# Patient Record
Sex: FEMALE | Race: WHITE | NOT HISPANIC OR LATINO
[De-identification: names, ages, dates, MRNs, and addresses within clinical notes are randomized per-mention and may not be internally consistent; named-entity substitution may affect disease eponyms.]

---

## 2017-12-22 ENCOUNTER — TRANSCRIPTION ENCOUNTER (OUTPATIENT)
Age: 42
End: 2017-12-22

## 2017-12-24 ENCOUNTER — TRANSCRIPTION ENCOUNTER (OUTPATIENT)
Age: 42
End: 2017-12-24

## 2019-07-24 ENCOUNTER — TRANSCRIPTION ENCOUNTER (OUTPATIENT)
Age: 44
End: 2019-07-24

## 2022-10-30 ENCOUNTER — NON-APPOINTMENT (OUTPATIENT)
Age: 47
End: 2022-10-30

## 2023-10-05 ENCOUNTER — NON-APPOINTMENT (OUTPATIENT)
Age: 48
End: 2023-10-05

## 2024-03-07 PROBLEM — Z00.00 ENCOUNTER FOR PREVENTIVE HEALTH EXAMINATION: Status: ACTIVE | Noted: 2024-03-07

## 2024-03-19 ENCOUNTER — APPOINTMENT (OUTPATIENT)
Dept: GYNECOLOGIC ONCOLOGY | Facility: CLINIC | Age: 49
End: 2024-03-19
Payer: COMMERCIAL

## 2024-03-19 VITALS
RESPIRATION RATE: 18 BRPM | WEIGHT: 162 LBS | BODY MASS INDEX: 27.66 KG/M2 | TEMPERATURE: 98 F | OXYGEN SATURATION: 100 % | DIASTOLIC BLOOD PRESSURE: 89 MMHG | HEIGHT: 64 IN | HEART RATE: 76 BPM | SYSTOLIC BLOOD PRESSURE: 160 MMHG

## 2024-03-19 DIAGNOSIS — Z80.9 FAMILY HISTORY OF MALIGNANT NEOPLASM, UNSPECIFIED: ICD-10-CM

## 2024-03-19 DIAGNOSIS — Z78.9 OTHER SPECIFIED HEALTH STATUS: ICD-10-CM

## 2024-03-19 DIAGNOSIS — Z80.1 FAMILY HISTORY OF MALIGNANT NEOPLASM OF TRACHEA, BRONCHUS AND LUNG: ICD-10-CM

## 2024-03-19 DIAGNOSIS — Z82.49 FAMILY HISTORY OF ISCHEMIC HEART DISEASE AND OTHER DISEASES OF THE CIRCULATORY SYSTEM: ICD-10-CM

## 2024-03-19 DIAGNOSIS — G35 MULTIPLE SCLEROSIS: ICD-10-CM

## 2024-03-19 DIAGNOSIS — Z86.39 PERSONAL HISTORY OF OTHER ENDOCRINE, NUTRITIONAL AND METABOLIC DISEASE: ICD-10-CM

## 2024-03-19 DIAGNOSIS — Z80.7 FAMILY HISTORY OF OTHER MALIGNANT NEOPLASMS OF LYMPHOID, HEMATOPOIETIC AND RELATED TISSUES: ICD-10-CM

## 2024-03-19 DIAGNOSIS — Z84.89 FAMILY HISTORY OF OTHER SPECIFIED CONDITIONS: ICD-10-CM

## 2024-03-19 DIAGNOSIS — Z80.6 FAMILY HISTORY OF LEUKEMIA: ICD-10-CM

## 2024-03-19 DIAGNOSIS — Z86.79 PERSONAL HISTORY OF OTHER DISEASES OF THE CIRCULATORY SYSTEM: ICD-10-CM

## 2024-03-19 DIAGNOSIS — Z86.59 PERSONAL HISTORY OF OTHER MENTAL AND BEHAVIORAL DISORDERS: ICD-10-CM

## 2024-03-19 DIAGNOSIS — Z83.438 FAMILY HISTORY OF OTHER DISORDER OF LIPOPROTEIN METABOLISM AND OTHER LIPIDEMIA: ICD-10-CM

## 2024-03-19 DIAGNOSIS — Z80.3 FAMILY HISTORY OF MALIGNANT NEOPLASM OF BREAST: ICD-10-CM

## 2024-03-19 PROCEDURE — G2211 COMPLEX E/M VISIT ADD ON: CPT | Mod: NC,1L

## 2024-03-19 PROCEDURE — 99204 OFFICE O/P NEW MOD 45 MIN: CPT

## 2024-03-19 PROCEDURE — 99459 PELVIC EXAMINATION: CPT

## 2024-03-19 RX ORDER — LOSARTAN POTASSIUM 50 MG/1
50 TABLET, FILM COATED ORAL
Refills: 0 | Status: ACTIVE | COMMUNITY

## 2024-03-19 RX ORDER — LEVOTHYROXINE SODIUM 25 UG/1
25 TABLET ORAL
Refills: 0 | Status: ACTIVE | COMMUNITY

## 2024-03-19 RX ORDER — BUPROPION HYDROCHLORIDE 300 MG/1
300 TABLET, EXTENDED RELEASE ORAL
Refills: 0 | Status: ACTIVE | COMMUNITY

## 2024-03-19 NOTE — CHIEF COMPLAINT
There are no Wet Read(s) to document.
[FreeTextEntry1] : here for evaluation of her thicknened endometrium and ovarian mass

## 2024-03-19 NOTE — PHYSICAL EXAM
[Chaperone Present] : A chaperone was present in the examining room during all aspects of the physical examination [Absent] : CVAT: absent [Abnormal] : Adnexa(ae): Abnormal [Normal] : Recto-Vaginal Exam: Normal [Fully active, able to carry on all pre-disease performance without restriction] : Status 0 - Fully active, able to carry on all pre-disease performance without restriction [de-identified] : AV not tender mobile

## 2024-03-19 NOTE — ASSESSMENT
[FreeTextEntry1] : Patient at age 48 has an enlarged ovarian cyst most likely from the left side.  The cyst also appears to have thickened wall. Furthermore the patient complaining of significant vaginal discharge in the last month or 2 which has gotten worse.  She has had several evaluation including multiple test for infection including GC chlamydia bacterial vaginosis and general vaginal culture.  Of them have turned out to be negative so far.  Assessment of the cervix revealed a discharge coming out of the level of the cervical os which is consistent with a finding of a large cyst within the endocervical canal.  Clinically I believe that the cyst in the ovary is benign.  However given this thickened wall I would still recommend that the patient consider removal of the ovary and tube with a frozen section analysis. Given patient's age I would recommend her to consider removal of both ovaries and tubes in view of her extensive family history of cancer and also her nulliparous status which makes her at a slight increased risk for ovarian cancer compared to the general population. Finally I would definitely dilate the cervical canal and drain the fluid from the cyst fluid.  In addition I would also perform a D&C hysteroscopy realizing that the lining is at 6 mm in thickness.  Discussed minimal invasive approach with robotic assistance. Discussed usage of antibiotics DVT prophylaxis and possible transfusion. Discussed length of the procedure. Discussed the possibility of a staging surgery including hysterectomy BSO nodes and omentum in the case of a malignancy on frozen section. Discussed risk of laparotomy which is small. This will most likely be a same-day discharge as well as patient meets the milestones. Discussed possible complication and repair. Discussed postoperative care and instruction. Discussed the possibility of a staging surgery including hysterectomy in the case that the frozen section report came back positive for malignancy Patient has no further question. While I recommend that she should consider removal of both ovaries and tubes at the minimum along with the hysteroscopic evaluation and evacuation the patient will let me know in the morning of the procedure whether she prefer 1 ovary and tube removal or both ovaries and tubes removal in case that this is a benign finding. I answered all their question. Patient's father is undergoing his procedure tomorrow.  She would like to make sure that Stefani is okay with him before proceeding Send today for surgery on April 29 2024 She will need a medical clearance along with a presurgical testing date I have ordered a  also for her  Procedure  RA laparoscopic USO versus BSO and staging, D&C hysteroscopy   Indication Large ovarian mass and vaginal discharge from the cervical canal   Benefits  Removal of diseased tissue and removal of her discharge   Risks  Discussed risks of blood loss, infection, injury to adjacent organs, potential need for transfusion and other events.   Discussed alternatives  Observation

## 2024-03-19 NOTE — HISTORY OF PRESENT ILLNESS
[FreeTextEntry1] : 47y/o P 0000 LMP 2/2023 who last saw Dr Berkowitz in 6/23  Prior to that it was about 3 years ago.  An ovarian cyst was noted at the time.  Patient did not have any symptoms of pain, abnormal bleeding.   Patient had a sonogram that showed a left ovarian cyst that has a mildly thickened lining. Again she had no pain no bleeding.   Symptom of hot flashes per patient. Recently she c/o significant vaginal discharge that is yellowish and thick.  There was no odor.  There was no bleeding  she had an MRI after that showing similar finding of a large ovary on the left side [id number] with an ovarian cyst that measured 6.1x4.3x4.7cm.   In addition there is a cyst in the endocervical canal at 3.3x3.1x3cm. It was noted in the previous sonogram but much smaller at 1.7x0.9x2cm.  The uterus has a 2.2x2x2.6cm intramural fibroid and a lining of 0.6cm.  she came here for further evaluation.  Her major concern is her continuous discharge  H/O of abnormal Pap.  Colposcopy was negative for any evidence of abnormality  Mammogram 6/2023 colonoscopy has not had one done yet no family history of colon cancer Pap done 6/2023

## 2024-03-19 NOTE — LETTER BODY
[Attached please find my note.] : Attached please find my note. [FreeTextEntry2] : Gibran Berkowitz MD [address] [facility] [address] [place] [address] Tel [phone] [phone]  Dear Dr Berkowitz   Ms Nidhi Russell was seen in my office for a consultation regarding her vaginal discharge and ovarian cyst.  Please see my consult note for her plan of care.  Thank you for allowing me to participate in the care of this patient.    Please do not hesitate to call if you have any questions.    Most Sincerely,      Josiah Moss MD [facility] Director, [facility] Division of Gynecologic Oncology Department Obstetrics and Gynecology Tel [phone] [email or web address]

## 2024-03-20 LAB — CANCER AG125 SERPL-ACNC: 34 U/ML

## 2024-04-18 ENCOUNTER — OUTPATIENT (OUTPATIENT)
Dept: OUTPATIENT SERVICES | Facility: HOSPITAL | Age: 49
LOS: 1 days | End: 2024-04-18
Payer: COMMERCIAL

## 2024-04-18 VITALS
WEIGHT: 162.92 LBS | TEMPERATURE: 98 F | OXYGEN SATURATION: 98 % | DIASTOLIC BLOOD PRESSURE: 80 MMHG | HEIGHT: 64 IN | HEART RATE: 71 BPM | RESPIRATION RATE: 18 BRPM | SYSTOLIC BLOOD PRESSURE: 151 MMHG

## 2024-04-18 DIAGNOSIS — E03.9 HYPOTHYROIDISM, UNSPECIFIED: ICD-10-CM

## 2024-04-18 DIAGNOSIS — Z98.890 OTHER SPECIFIED POSTPROCEDURAL STATES: Chronic | ICD-10-CM

## 2024-04-18 DIAGNOSIS — Z90.89 ACQUIRED ABSENCE OF OTHER ORGANS: Chronic | ICD-10-CM

## 2024-04-18 DIAGNOSIS — G47.00 INSOMNIA, UNSPECIFIED: ICD-10-CM

## 2024-04-18 DIAGNOSIS — F32.89 OTHER SPECIFIED DEPRESSIVE EPISODES: ICD-10-CM

## 2024-04-18 DIAGNOSIS — E78.5 HYPERLIPIDEMIA, UNSPECIFIED: ICD-10-CM

## 2024-04-18 DIAGNOSIS — Z01.818 ENCOUNTER FOR OTHER PREPROCEDURAL EXAMINATION: ICD-10-CM

## 2024-04-18 DIAGNOSIS — N83.209 UNSPECIFIED OVARIAN CYST, UNSPECIFIED SIDE: ICD-10-CM

## 2024-04-18 DIAGNOSIS — M19.90 UNSPECIFIED OSTEOARTHRITIS, UNSPECIFIED SITE: ICD-10-CM

## 2024-04-18 DIAGNOSIS — N89.8 OTHER SPECIFIED NONINFLAMMATORY DISORDERS OF VAGINA: ICD-10-CM

## 2024-04-18 DIAGNOSIS — I10 ESSENTIAL (PRIMARY) HYPERTENSION: ICD-10-CM

## 2024-04-18 DIAGNOSIS — Z90.49 ACQUIRED ABSENCE OF OTHER SPECIFIED PARTS OF DIGESTIVE TRACT: Chronic | ICD-10-CM

## 2024-04-18 DIAGNOSIS — G35 MULTIPLE SCLEROSIS: ICD-10-CM

## 2024-04-18 LAB — BLD GP AB SCN SERPL QL: SIGNIFICANT CHANGE UP

## 2024-04-18 NOTE — H&P PST ADULT - PROBLEM SELECTOR PLAN 3
Instructed to continue antihypertensive medication-Losartan and take it with sips of water on day of surgery. Follow-up with PCP for management.

## 2024-04-18 NOTE — H&P PST ADULT - HISTORY OF PRESENT ILLNESS
This is a 48 yr old female with PMH of multiple sclerosis-asymptomatic-not in any meds-diagnosed in 2001-last seen by neurology in 2020, HTN on Losartan, Hypothyroidism on levothyroxine, depression on Bupropion, OA of knees and low back pain, insomnia on Melatonin, COVID-19 virus infection in 2022-fully recovered,  who presents with c/o left ovarian cyst and vaginal discharge yellowish in color without any odor. Pt is scheduled for robotic assisted laparoscopic salpingo-oophorectomy (side to be determined in the OR) with dilation and curettage, hysteroscopy on 4/29/2024.

## 2024-04-18 NOTE — H&P PST ADULT - ATTENDING COMMENTS
Patient seen at the surgical suite  We reiterated the planned procedure  she opts to have one ovary and tube removed only assuming that this is not cancerous.  will also perform D & C Hysteroscopy    discussed possible staging procedure  discussed possible complications  discussed post operative care  answered all questions  consent form signed  will proceed as planned

## 2024-04-18 NOTE — H&P PST ADULT - NSICDXFAMILYHX_GEN_ALL_CORE_FT
FAMILY HISTORY:  Father  Still living? Yes, Estimated age: Age Unknown  Family history of stomach cancer, Age at diagnosis: Age Unknown     FAMILY HISTORY:  Father  Still living? Yes, Estimated age: Age Unknown  Family history of stomach cancer, Age at diagnosis: Age Unknown    Mother  Still living? Yes, Estimated age: Age Unknown  Family history of hyperlipidemia, Age at diagnosis: Age Unknown  Family history of hypertension, Age at diagnosis: Age Unknown    Sibling  Still living? Yes, Estimated age: Age Unknown  Family history of hypertension, Age at diagnosis: Age Unknown

## 2024-04-18 NOTE — H&P PST ADULT - PROBLEM SELECTOR PLAN 4
pt asymptomatic-not in any meds-diagnosed in 2001-last seen by neurology in 2020.  Last visit note with neurology requested.  Encouraged pt to make appt with neurology for [id number] will try to make appt before surgery.

## 2024-04-18 NOTE — H&P PST ADULT - ASSESSMENT
From: Narvis Filter  To: Chasidy Hoff  Sent: 12/2/2023 8:01 AM CST  Subject: Group B strep test    Hello, I saw my gyne for some issues I was having and she did some cultures and she said I tested positive for group b strep in my vaginal culture. My question is that I read that joint pain could be a symptom of this infection. I have been having joint pain in my elbow for a few months now and was wondering if this could be the cause and if I should be following up with you about that or are the 2 unrelated? Thank you for the help! This is a 48 yr old female with PMH of multiple sclerosis-asymptomatic-not in any meds-diagnosed in 2001-last seen by neurology in 2020, HTN on Losartan, Hypothyroidism on levothyroxine, depression on Bupropion, OA of knees and low back pain, insomnia on Melatonin, COVID-19 virus infection in 2022-fully recovered,  who presents with left ovarian cyst and vaginal discharge. Pt is scheduled for robotic assisted laparoscopic salpingo-oophorectomy (side to be determined in the OR) with dilation and curettage, hysteroscopy on 4/29/2024.  AMANDA stop bang score 4. Pt denies history of AMANDA, never did sleep study.

## 2024-04-18 NOTE — H&P PST ADULT - NSICDXPASTMEDICALHX_GEN_ALL_CORE_FT
PAST MEDICAL HISTORY:  Hypertension     Hypothyroidism     MS (multiple sclerosis)      PAST MEDICAL HISTORY:  Allergic rhinitis     Discharge from the vagina     Hyperlipidemia     Hypertension     Hypothyroidism     MS (multiple sclerosis)     Other depression     Ovarian cyst

## 2024-04-18 NOTE — H&P PST ADULT - PROBLEM SELECTOR PLAN 9
Pt instructed to avoid NSAIDs-meloxicam 1 week before surgery,  to take Tylenol as needed for pain.   Follow-up with PCP for management.   Stated understanding of instructions given.

## 2024-04-18 NOTE — H&P PST ADULT - NSICDXPROCEDURE_GEN_ALL_CORE_FT
PROCEDURES:  Hysteroscopy, with dilation and curettage 18-Apr-2024 11:37:17  Hilda Pham  Robot-assisted salpingo-oophorectomy 18-Apr-2024 11:38:11  Hilda Pham

## 2024-04-18 NOTE — H&P PST ADULT - PROBLEM SELECTOR PLAN 1
Pt is scheduled for robotic assisted laparoscopic salpingo-oophorectomy (side to be determined in the OR)  on 4/29/2024.  AMANDA stop bang score 4. Pt denies history of AMANDA, never did sleep study.  Preoperative instructions discussed with pt and given to pt. Instructed pt that she will need someone to escort her home after surgery, to notify security when she arrives in the lobby of the hospital that she is here for surgery, not to eat or drink anything after midnight the night before the surgery, to avoid NSAIDs such as Ibuprofen, Motrin, Aleve, Advil, Naproxen before surgery, to take Tylenol if needed for pain, to report if she has been exposed to any one with any contagious diseases including Covid-19 or if she is exhibiting any symptoms of COVID-19. Instructed about use of Chlorhexidine 4% soap for 3 days before surgery including the morning of the surgery to prevent infection. Verbalized understanding of instructions given.

## 2024-04-18 NOTE — H&P PST ADULT - NSICDXPASTSURGICALHX_GEN_ALL_CORE_FT
PAST SURGICAL HISTORY:  History of appendectomy     History of bunionectomy of both great toes     History of open reduction and internal fixation (ORIF) procedure     History of tonsillectomy

## 2024-04-18 NOTE — H&P PST ADULT - PROBLEM SELECTOR PLAN 5
Instructed to continue Levothyroxine and take it with sips of water on day of surgery.   Will obtain last TSH from PCP.  Follow-up with provider for management.

## 2024-04-19 PROCEDURE — G0463: CPT

## 2024-04-28 ENCOUNTER — TRANSCRIPTION ENCOUNTER (OUTPATIENT)
Age: 49
End: 2024-04-28

## 2024-04-29 ENCOUNTER — TRANSCRIPTION ENCOUNTER (OUTPATIENT)
Age: 49
End: 2024-04-29

## 2024-04-29 ENCOUNTER — APPOINTMENT (OUTPATIENT)
Dept: GYNECOLOGIC ONCOLOGY | Facility: HOSPITAL | Age: 49
End: 2024-04-29
Payer: COMMERCIAL

## 2024-04-29 ENCOUNTER — OUTPATIENT (OUTPATIENT)
Dept: OUTPATIENT SERVICES | Facility: HOSPITAL | Age: 49
LOS: 1 days | End: 2024-04-29
Payer: COMMERCIAL

## 2024-04-29 ENCOUNTER — RESULT REVIEW (OUTPATIENT)
Age: 49
End: 2024-04-29

## 2024-04-29 VITALS
HEART RATE: 60 BPM | DIASTOLIC BLOOD PRESSURE: 70 MMHG | SYSTOLIC BLOOD PRESSURE: 110 MMHG | RESPIRATION RATE: 11 BRPM | OXYGEN SATURATION: 100 % | TEMPERATURE: 98 F

## 2024-04-29 VITALS
RESPIRATION RATE: 16 BRPM | HEART RATE: 81 BPM | TEMPERATURE: 98 F | WEIGHT: 162.92 LBS | OXYGEN SATURATION: 97 % | SYSTOLIC BLOOD PRESSURE: 134 MMHG | HEIGHT: 64 IN | DIASTOLIC BLOOD PRESSURE: 82 MMHG

## 2024-04-29 DIAGNOSIS — Z90.49 ACQUIRED ABSENCE OF OTHER SPECIFIED PARTS OF DIGESTIVE TRACT: Chronic | ICD-10-CM

## 2024-04-29 DIAGNOSIS — Z98.890 OTHER SPECIFIED POSTPROCEDURAL STATES: Chronic | ICD-10-CM

## 2024-04-29 DIAGNOSIS — N83.209 UNSPECIFIED OVARIAN CYST, UNSPECIFIED SIDE: ICD-10-CM

## 2024-04-29 DIAGNOSIS — Z90.89 ACQUIRED ABSENCE OF OTHER ORGANS: Chronic | ICD-10-CM

## 2024-04-29 DIAGNOSIS — N89.8 OTHER SPECIFIED NONINFLAMMATORY DISORDERS OF VAGINA: ICD-10-CM

## 2024-04-29 LAB — BLD GP AB SCN SERPL QL: SIGNIFICANT CHANGE UP

## 2024-04-29 PROCEDURE — 86850 RBC ANTIBODY SCREEN: CPT

## 2024-04-29 PROCEDURE — 86900 BLOOD TYPING SEROLOGIC ABO: CPT

## 2024-04-29 PROCEDURE — 86923 COMPATIBILITY TEST ELECTRIC: CPT

## 2024-04-29 PROCEDURE — 36415 COLL VENOUS BLD VENIPUNCTURE: CPT

## 2024-04-29 PROCEDURE — 58661 LAPAROSCOPY REMOVE ADNEXA: CPT | Mod: AS

## 2024-04-29 PROCEDURE — 58558 HYSTEROSCOPY BIOPSY: CPT

## 2024-04-29 PROCEDURE — 88305 TISSUE EXAM BY PATHOLOGIST: CPT | Mod: 26

## 2024-04-29 PROCEDURE — 88305 TISSUE EXAM BY PATHOLOGIST: CPT

## 2024-04-29 PROCEDURE — 88112 CYTOPATH CELL ENHANCE TECH: CPT

## 2024-04-29 PROCEDURE — 88112 CYTOPATH CELL ENHANCE TECH: CPT | Mod: 26

## 2024-04-29 PROCEDURE — 58661 LAPAROSCOPY REMOVE ADNEXA: CPT | Mod: LT

## 2024-04-29 PROCEDURE — 58661 LAPAROSCOPY REMOVE ADNEXA: CPT

## 2024-04-29 PROCEDURE — 88307 TISSUE EXAM BY PATHOLOGIST: CPT

## 2024-04-29 PROCEDURE — S2900: CPT

## 2024-04-29 PROCEDURE — 88307 TISSUE EXAM BY PATHOLOGIST: CPT | Mod: 26

## 2024-04-29 PROCEDURE — 86901 BLOOD TYPING SEROLOGIC RH(D): CPT

## 2024-04-29 RX ORDER — CHOLECALCIFEROL (VITAMIN D3) 125 MCG
3 CAPSULE ORAL
Refills: 0 | DISCHARGE

## 2024-04-29 RX ORDER — LANOLIN ALCOHOL/MO/W.PET/CERES
1 CREAM (GRAM) TOPICAL
Refills: 0 | DISCHARGE

## 2024-04-29 RX ORDER — FENTANYL CITRATE 50 UG/ML
25 INJECTION INTRAVENOUS
Refills: 0 | Status: DISCONTINUED | OUTPATIENT
Start: 2024-04-29 | End: 2024-04-29

## 2024-04-29 RX ORDER — HALOPERIDOL DECANOATE 100 MG/ML
1 INJECTION INTRAMUSCULAR ONCE
Refills: 0 | Status: COMPLETED | OUTPATIENT
Start: 2024-04-29 | End: 2024-04-29

## 2024-04-29 RX ORDER — ACETAMINOPHEN 500 MG
3 TABLET ORAL
Qty: 0 | Refills: 0 | DISCHARGE

## 2024-04-29 RX ORDER — OXYCODONE HYDROCHLORIDE 5 MG/1
1 TABLET ORAL
Qty: 5 | Refills: 0
Start: 2024-04-29

## 2024-04-29 RX ORDER — FAMOTIDINE 10 MG/ML
20 INJECTION INTRAVENOUS DAILY
Refills: 0 | Status: DISCONTINUED | OUTPATIENT
Start: 2024-04-29 | End: 2024-05-13

## 2024-04-29 RX ORDER — MELOXICAM 15 MG/1
1 TABLET ORAL
Refills: 0 | DISCHARGE

## 2024-04-29 RX ORDER — SODIUM CHLORIDE 9 MG/ML
3 INJECTION INTRAMUSCULAR; INTRAVENOUS; SUBCUTANEOUS EVERY 8 HOURS
Refills: 0 | Status: DISCONTINUED | OUTPATIENT
Start: 2024-04-29 | End: 2024-04-29

## 2024-04-29 RX ORDER — HALOPERIDOL DECANOATE 100 MG/ML
1 INJECTION INTRAMUSCULAR ONCE
Refills: 0 | Status: DISCONTINUED | OUTPATIENT
Start: 2024-04-29 | End: 2024-04-29

## 2024-04-29 RX ORDER — HYDROMORPHONE HYDROCHLORIDE 2 MG/ML
1 INJECTION INTRAMUSCULAR; INTRAVENOUS; SUBCUTANEOUS
Refills: 0 | Status: DISCONTINUED | OUTPATIENT
Start: 2024-04-29 | End: 2024-04-29

## 2024-04-29 RX ORDER — SCOPALAMINE 1 MG/3D
1 PATCH, EXTENDED RELEASE TRANSDERMAL ONCE
Refills: 0 | Status: COMPLETED | OUTPATIENT
Start: 2024-04-29 | End: 2024-04-29

## 2024-04-29 RX ORDER — IBUPROFEN 200 MG
3 TABLET ORAL
Qty: 0 | Refills: 0 | DISCHARGE

## 2024-04-29 RX ORDER — LEVOTHYROXINE SODIUM 125 MCG
1 TABLET ORAL
Refills: 0 | DISCHARGE

## 2024-04-29 RX ORDER — SODIUM CHLORIDE 9 MG/ML
1000 INJECTION, SOLUTION INTRAVENOUS
Refills: 0 | Status: DISCONTINUED | OUTPATIENT
Start: 2024-04-29 | End: 2024-04-29

## 2024-04-29 RX ORDER — BUPROPION HYDROCHLORIDE 150 MG/1
1 TABLET, EXTENDED RELEASE ORAL
Refills: 0 | DISCHARGE

## 2024-04-29 RX ORDER — CALCIUM CARBONATE 500(1250)
1 TABLET ORAL
Refills: 0 | DISCHARGE

## 2024-04-29 RX ORDER — SIMETHICONE 80 MG/1
80 TABLET, CHEWABLE ORAL EVERY 6 HOURS
Refills: 0 | Status: DISCONTINUED | OUTPATIENT
Start: 2024-04-29 | End: 2024-05-13

## 2024-04-29 RX ORDER — ONDANSETRON 8 MG/1
4 TABLET, FILM COATED ORAL ONCE
Refills: 0 | Status: COMPLETED | OUTPATIENT
Start: 2024-04-29 | End: 2024-04-29

## 2024-04-29 RX ORDER — LOSARTAN POTASSIUM 100 MG/1
1 TABLET, FILM COATED ORAL
Refills: 0 | DISCHARGE

## 2024-04-29 RX ORDER — ACETAMINOPHEN 500 MG
1000 TABLET ORAL EVERY 6 HOURS
Refills: 0 | Status: DISCONTINUED | OUTPATIENT
Start: 2024-04-29 | End: 2024-05-13

## 2024-04-29 RX ADMIN — ONDANSETRON 4 MILLIGRAM(S): 8 TABLET, FILM COATED ORAL at 14:08

## 2024-04-29 RX ADMIN — HALOPERIDOL DECANOATE 1 MILLIGRAM(S): 100 INJECTION INTRAMUSCULAR at 15:38

## 2024-04-29 RX ADMIN — SCOPALAMINE 1 PATCH: 1 PATCH, EXTENDED RELEASE TRANSDERMAL at 09:14

## 2024-04-29 RX ADMIN — FAMOTIDINE 20 MILLIGRAM(S): 10 INJECTION INTRAVENOUS at 17:16

## 2024-04-29 RX ADMIN — SODIUM CHLORIDE 3 MILLILITER(S): 9 INJECTION INTRAMUSCULAR; INTRAVENOUS; SUBCUTANEOUS at 09:11

## 2024-04-29 NOTE — ASU PATIENT PROFILE, ADULT - FALL HARM RISK - UNIVERSAL INTERVENTIONS
Bed in lowest position, wheels locked, appropriate side rails in place/Call bell, personal items and telephone in reach/Instruct patient to call for assistance before getting out of bed or chair/Non-slip footwear when patient is out of bed/[facility] to call system/Physically safe environment - no spills, clutter or unnecessary equipment/Purposeful Proactive Rounding/Room/bathroom lighting operational, light cord in reach

## 2024-04-29 NOTE — ASU DISCHARGE PLAN (ADULT/PEDIATRIC) - PROVIDER TOKENS
PROVIDER:[TOKEN:[[id number]:[facility]:[id number]],SCHEDULEDAPPT:[05/14/2024],SCHEDULEDAPPTTIME:[03:00 PM]]

## 2024-04-29 NOTE — ASU DISCHARGE PLAN (ADULT/PEDIATRIC) - PROCEDURE
Dilation and Curettage with Hysteroscopy, Robotic Laparoscopic *** Salpingo-oophorectomy Dilation and Curettage with Hysteroscopy, Incision and Drainage of Cervix Cyst, Robotic Left Salpingo-oophorectomy

## 2024-04-29 NOTE — ASU PREOP CHECKLIST - VIA
3/21/2018 1:24 PM 
 
Ms. Lyn Coelho [address] [facility] [address] Dear Lyn Coelho: 
 
  
    
  Your results are normal/stable. If not signed up, consider getting my chart to get your results on-line. We can help you to sign up. MIldly elevated liver enzymes, weight loss frequently helps this. We discussed stratagies to reduce weight. Specifically discussed the Intemmitant Fasting diet, the [address] [place] [address] Avenue. Discussed weight loss programs as well as exercise, although emphasized caloric restriction. Consider keeping a food diary and seeing what you eat in a day and looking for where you can cut calories. Try taking a picture of everything you eat for a day. Phone apps can help witrh weight loss. Consider 'Lose It' You must reduce liquid calories like soda and juices. Weight Watchers or other weight loss programs can be very helpful. There is no \"magic pill', but there some newly FDA approved medications for obesity. Please find your most recent results below. Resulted Orders CBC W/O DIFF Result Value Ref Range WBC 8.7 3.4 - 10.8 x10E3/uL  
 RBC 4.74 3.77 - 5.28 x10E6/uL HGB 14.6 11.1 - 15.9 g/dL HCT 42.8 34.0 - 46.6 % MCV 90 79 - 97 fL  
 MCH 30.8 26.6 - 33.0 pg  
 MCHC 34.1 31.5 - 35.7 g/dL  
 RDW 14.2 12.3 - 15.4 % PLATELET 716 (L) 190 - 379 x10E3/uL Narrative Performed at:  [address]  [id number] : Pamela Peoples MD, Phone:  [phone] HEPATIC FUNCTION PANEL Result Value Ref Range Protein, total 7.4 6.0 - 8.5 g/dL Albumin 4.5 3.5 - 5.5 g/dL Bilirubin, total 0.2 0.0 - 1.2 mg/dL Bilirubin, direct 0.06 0.00 - 0.40 mg/dL Alk. phosphatase 95 39 - 117 IU/L  
 AST (SGOT) 47 (H) 0 - 40 IU/L  
 ALT (SGPT) 68 (H) 0 - 32 IU/L Narrative Performed at:  [facility]  [address]  [id number] : Debora Casiano MD, Phone:  [phone] HEPATITIS C AB Result Value Ref Range Hep C Virus Ab <0.1 0.0 - 0.9 s/co ratio Comment:  
                                     Negative:     < 0.8 Indeterminate: 0.8 - 0.9 Positive:     > 0.9 The CDC recommends that a positive HCV antibody result 
 be followed up with a HCV Nucleic Acid Amplification 
 test (446080). Narrative Performed at:  [address]  [id number] : Debora Casiano MD, Phone:  [phone] HEP B SURFACE AG Result Value Ref Range Hep B surface Ag screen Negative Negative Narrative Performed at:  [address]  [id number] : Debora Casiano MD, Phone:  [phone] Please call me if you have any questions: [phone] Sincerely, Brendan Tirado MD 
 wheelchair

## 2024-04-29 NOTE — CHART NOTE - NSCHARTNOTEFT_GEN_A_CORE
Patient seen at bedside in PACU feeling a bit groggy, but offering no complaints. Patient voided 200 cc in a hat without difficulties- small tinge of blood noted. incisions are dry clean and intact, minimal vaginal bleeding. I verbalized at home instructions to patient , patient verbalized understanding. Patient stable for discharge.   - Dr. Moss made aware.

## 2024-04-29 NOTE — BRIEF OPERATIVE NOTE - OPERATION/FINDINGS
Exam under anesthesia revealed stenotic external cervical os and softly enlarged cervical canal, as well as fullness of posterior cul-de-sac.   Laparoscopy revealed atraumatic entry site and grossly normal liver edge, gallbladder, diaphragm, peritoneum, bowel, omentum, uterus, bilateral fallopian tubes, and right ovary. Left ovary overtaken by 8 cm cystic mass. Excellent hemostasis at close of case. Exam under anesthesia revealed stenotic external cervical os and softly enlarged/rounded cervix. There was fullness of the posterior cul-de-sac on bimanual exam.   Laparoscopy revealed atraumatic entry site and grossly normal liver edge, gallbladder, diaphragm, peritoneum, bowel, omentum, uterus, bilateral fallopian tubes, and right ovary. Left ovary overtaken by 8 cm cystic mass. Excellent hemostasis at close of case.

## 2024-04-29 NOTE — BRIEF OPERATIVE NOTE - NSICDXBRIEFPROCEDURE_GEN_ALL_CORE_FT
PROCEDURES:  Hysteroscopy, with dilation and curettage 29-Apr-2024 13:45:28  Tanya Orosco  Robot-assisted left salpingo-oophorectomy 29-Apr-2024 13:46:52  Tanya Orosco

## 2024-04-29 NOTE — ASU DISCHARGE PLAN (ADULT/PEDIATRIC) - ASU DC SPECIAL INSTRUCTIONSFT
Return to your regular way of eating. Resume normal activity as tolerated, but no heavy lifting or strenuous activity until cleared by your doctor. No driving for the next 1-2 weeks and/or while on narcotic pain medication. Complete vaginal rest, no tampons, no douching, no tub bathing, no sexual activities until instructed by your doctor. Expect to have vaginal bleeding/spotting that will get lighter over time. For bleeding soaking through more than two pads per hour for two consecutive hours, or for passing clots greater than the size of your fist, call your doctor or come to the emergency department. Call your doctor with any signs or symptoms of infection such as fevers, chills, nausea or vomiting. Call your doctor with redness or swelling at the incision site, fluid leakage from the incision, or wound separation. Do not pull or cut any stitches you see around your incision. Call your doctor if you're unable to tolerate food or have difficulty urinating. Call your doctor if you have pain that is not relieved by your prescribed medications. Notify your doctor with any other concerns. Follow up with Dr. Moss as scheduled.    You can take up to 600 mg Ibuprofen every 6 hours and/or 975 mg Tylenol every 6 hours for pain. You can take 5 mg Oxycodone every 6 hours as needed for severe pain.

## 2024-04-29 NOTE — ASU DISCHARGE PLAN (ADULT/PEDIATRIC) - CARE PROVIDER_API CALL
[facility] MossFreedom  Gynecologic Oncology  [address] [address] [place] [address], [place] [address]  Phone: [phone]  Fax: [phone]  Scheduled Appointment: 05/14/2024 03:00 PM

## 2024-04-29 NOTE — ASU PATIENT PROFILE, ADULT - NSICDXPASTMEDICALHX_GEN_ALL_CORE_FT
PAST MEDICAL HISTORY:  Allergic rhinitis     Discharge from the vagina     Hyperlipidemia     Hypertension     Hypothyroidism     MS (multiple sclerosis)     Other depression     Ovarian cyst

## 2024-04-30 LAB — NON-GYNECOLOGICAL CYTOLOGY STUDY: SIGNIFICANT CHANGE UP

## 2024-05-01 LAB — SURGICAL PATHOLOGY STUDY: SIGNIFICANT CHANGE UP

## 2024-05-13 ENCOUNTER — TRANSCRIPTION ENCOUNTER (OUTPATIENT)
Age: 49
End: 2024-05-13

## 2024-05-14 ENCOUNTER — APPOINTMENT (OUTPATIENT)
Dept: GYNECOLOGIC ONCOLOGY | Facility: CLINIC | Age: 49
End: 2024-05-14

## 2024-05-14 VITALS
RESPIRATION RATE: 16 BRPM | WEIGHT: 159 LBS | BODY MASS INDEX: 27.29 KG/M2 | OXYGEN SATURATION: 100 % | HEART RATE: 76 BPM | SYSTOLIC BLOOD PRESSURE: 130 MMHG | DIASTOLIC BLOOD PRESSURE: 85 MMHG

## 2024-05-14 PROBLEM — N83.209 UNSPECIFIED OVARIAN CYST, UNSPECIFIED SIDE: Chronic | Status: ACTIVE | Noted: 2024-04-18

## 2024-05-14 PROBLEM — N89.8 OTHER SPECIFIED NONINFLAMMATORY DISORDERS OF VAGINA: Chronic | Status: ACTIVE | Noted: 2024-04-18

## 2024-05-14 PROBLEM — F32.89 OTHER SPECIFIED DEPRESSIVE EPISODES: Chronic | Status: ACTIVE | Noted: 2024-04-18

## 2024-05-14 PROBLEM — G35 MULTIPLE SCLEROSIS: Chronic | Status: ACTIVE | Noted: 2024-04-18

## 2024-05-14 PROBLEM — E03.9 HYPOTHYROIDISM, UNSPECIFIED: Chronic | Status: ACTIVE | Noted: 2024-04-18

## 2024-05-14 PROBLEM — E78.5 HYPERLIPIDEMIA, UNSPECIFIED: Chronic | Status: ACTIVE | Noted: 2024-04-18

## 2024-05-14 PROBLEM — J30.9 ALLERGIC RHINITIS, UNSPECIFIED: Chronic | Status: ACTIVE | Noted: 2024-04-18

## 2024-05-14 PROBLEM — I10 ESSENTIAL (PRIMARY) HYPERTENSION: Chronic | Status: ACTIVE | Noted: 2024-04-18

## 2024-05-14 PROCEDURE — 99213 OFFICE O/P EST LOW 20 MIN: CPT

## 2024-05-14 NOTE — ASSESSMENT
[FreeTextEntry1] : Pathology report was discussed with the patient.  The ovarian finding was completely benign. The patient had question regarding the warnings of endometritis in the D&C specimen.  I tried to explain to her as much as she can.  Her increase was mostly regarding possible need for antibiotics which I advised her that there is no need, given lack of fever and abdominal pain.  The endometritis is just a inflammatory process that the patient's immune system exhibit.  Patient to return in about 2 to 3 weeks for a vaginal check. Otherwise there is no further intervention needed. All her questions were answered.

## 2024-05-14 NOTE — HISTORY OF PRESENT ILLNESS
[FreeTextEntry1] : Patient is a 56-year-old para 0 who is status post a D&C hysteroscopy and a left salpingo-oophorectomy April 22, 20024.  The procedure was uncomplicated.  The patient was discharged on the same day.  Findings at the time of the procedure revealed a copious amount of yellowish discharge upon entry into the cervical canal.  This is consistent with a blockage perhaps a nabothian cyst this is still not very clear to me.  The endometrial lining however appeared to be atrophic.  The pathology report was noted below.  The patient still have some discharge.  He has significantly improved however.  There is no bleeding.  5/14/24: Presents for first post-operative visit.   pathology report Specimen(s) Submitted 1  Left ovary and tube 2  Endometrial curettings  Final Diagnosis 1.  Left ovary and fallopian tube; salpingo-oophorectomy: -   Mucinous cystadenoma of ovary -   Segment of fallopian tube without significant pathology  2.  Endometrial curettings: -   Severe chronic and acute suppurative endometritis    Mammogram 6/2023 colonoscopy has not had one done yet no family history of colon cancer Pap done 6/2023

## 2024-05-14 NOTE — LETTER BODY
[Attached please find my note.] : Attached please find my note. [FreeTextEntry2] : Gibran Berkowitz MD [address] [facility] [address] [place] [address] Tel [phone] [phone]  Dear Dr Berkowitz   Ms Nidhi Russell was seen in my office for a post operative check up.  Please see my consult note for her plan of care.  Thank you for allowing me to participate in the care of this patient.    Please do not hesitate to call if you have any questions.    Most Sincerely,       Josiah Moss MD [facility] Director, [facility] Division of Gynecologic Oncology Department Obstetrics and Gynecology Tel [phone] [email or web address]  [FreeTextEntry1] : pathology report

## 2024-05-14 NOTE — PHYSICAL EXAM
[Normal] : Masses/Tenderness: Non-tender, no masses [Restricted in physically strenuous activity but ambulatory and able to carry out work of a light or sedentary nature] : Status 1- Restricted in physically strenuous activity but ambulatory and able to carry out work of a light or sedentary nature, e.g., light house work, office work [de-identified] : incisions c/d/i

## 2024-05-21 ENCOUNTER — NON-APPOINTMENT (OUTPATIENT)
Age: 49
End: 2024-05-21

## 2024-06-04 ENCOUNTER — APPOINTMENT (OUTPATIENT)
Dept: GYNECOLOGIC ONCOLOGY | Facility: CLINIC | Age: 49
End: 2024-06-04
Payer: COMMERCIAL

## 2024-06-04 VITALS
RESPIRATION RATE: 16 BRPM | HEART RATE: 62 BPM | DIASTOLIC BLOOD PRESSURE: 81 MMHG | OXYGEN SATURATION: 99 % | BODY MASS INDEX: 27.64 KG/M2 | TEMPERATURE: 97.7 F | SYSTOLIC BLOOD PRESSURE: 133 MMHG | WEIGHT: 161 LBS

## 2024-06-04 DIAGNOSIS — N89.8 OTHER SPECIFIED NONINFLAMMATORY DISORDERS OF VAGINA: ICD-10-CM

## 2024-06-04 DIAGNOSIS — N83.209 UNSPECIFIED OVARIAN CYST, UNSPECIFIED SIDE: ICD-10-CM

## 2024-06-04 PROCEDURE — 99212 OFFICE O/P EST SF 10 MIN: CPT

## 2024-06-04 NOTE — HISTORY OF PRESENT ILLNESS
[FreeTextEntry1] :   the patient had a D&C hysteroscopy along with a drainage of a cervical cyst fluid, A robotic assisted left salpingo-oophorectomy April 29 2024.  The patient came here for second postop check just to be sure that the fluid drainage has not continued.  The patient appear well.  She has no complaints.  She is not taking any pain medication. There is no further drainage of any of the fluid. She did have a few episodes of staining lasted about 7 to 10 days.

## 2024-06-04 NOTE — ASSESSMENT
[FreeTextEntry1] : Patient appears clinically stable.  She has no complaints. Given the fluids drainage has subsided we opted to defer any vaginal examination today.  Patient can return to her regular GYN for routine care at this point.

## (undated) DEVICE — DRAPE LEGGINGS 6" CUFF 28X43"

## (undated) DEVICE — XI SEAL UNIV 5- 8 MM

## (undated) DEVICE — POSITIONER FOAM EGG CRATE ULNAR 2PCS (PINK)

## (undated) DEVICE — SOL INJ NS 0.9% 100ML

## (undated) DEVICE — URETERAL CATH RED RUBBER 16FR (ORANGE)

## (undated) DEVICE — TUBING STRYKER HYSTEROSCOPY INFLOW OUTFLOW

## (undated) DEVICE — SOL IRR POUR NS 0.9% 1500ML

## (undated) DEVICE — XI ARM FORCE BIPOLAR 8MM

## (undated) DEVICE — XI DRAPE COLUMN

## (undated) DEVICE — ENDOCATCH II 15MM

## (undated) DEVICE — TUBING STRYKER PNEUMOCLEAR SMOKE EVACUATION HIGH FLOW

## (undated) DEVICE — PACK PERI GYN

## (undated) DEVICE — DRAPE 1/2 SHEET 40X57"

## (undated) DEVICE — UTERINE MANIPULATOR CONMED VCARE SM 32MM

## (undated) DEVICE — NDL SPINAL 22G X 1.5" (BLACK)

## (undated) DEVICE — PACK ROBOTIC

## (undated) DEVICE — ELCTR CUTTING LOOP MONOPOLAR ANGLED 24F

## (undated) DEVICE — WARMING BLANKET UPPER ADULT

## (undated) DEVICE — UTERINE MANIPULATOR CONMED VCARE MED 34MM

## (undated) DEVICE — PREP BETADINE KIT

## (undated) DEVICE — XI VESSEL SEALER

## (undated) DEVICE — SOL IRR POUR NS 0.9% 1000ML

## (undated) DEVICE — GLV 7.5 PROTEXIS (WHITE)

## (undated) DEVICE — XI ARM NEEDLE DRIVER MEGA

## (undated) DEVICE — DRAPE LIGHT HANDLE COVER (BLUE)

## (undated) DEVICE — DRAPE ROBOTIC

## (undated) DEVICE — FOR-ESU VALLEYLAB T7E14999DX: Type: DURABLE MEDICAL EQUIPMENT

## (undated) DEVICE — APPLICATOR FOR ARISTA XL 38CM

## (undated) DEVICE — GOWN LG

## (undated) DEVICE — ELCTR CORD FOOTSWITCH 1PLR LAPSCP 10FT

## (undated) DEVICE — SOL IRR SORBITOL 3% 3000ML

## (undated) DEVICE — SUT VICRYL PLUS 0 27" UR-6

## (undated) DEVICE — POSITIONER PINK PAD PIGAZZI SYSTEM

## (undated) DEVICE — ENDOCATCH GENERAL 10MM (PURPLE)

## (undated) DEVICE — UTERINE MANIPULATOR CONMED VCARE LG 37MM

## (undated) DEVICE — XI TIP COVER

## (undated) DEVICE — DRAPE LAVH 124" X 30" X125"

## (undated) DEVICE — INSUFFLATION NDL COVIDIEN SURGINEEDLE VERESS 120MM

## (undated) DEVICE — TUBING SET TUR BLADDER IRRIGATION Y-TYPE 81"

## (undated) DEVICE — DEVICE PUREVIEW ACTIVE

## (undated) DEVICE — SOL IRR POUR H2O 500ML

## (undated) DEVICE — LIGASURE ATLAS 10MM 20CM

## (undated) DEVICE — AVETA FLUID MANAGEMENT ACCESSORY

## (undated) DEVICE — AVETA CORAL HYSTEROSCOPE 4.6MM DISP

## (undated) DEVICE — PREP BETADINE SPONGE STICKS

## (undated) DEVICE — LIGASURE BLUNT TIP 37CM

## (undated) DEVICE — SOL INJ NS 0.9% 500ML 1-PORT

## (undated) DEVICE — TUBING TUR 2 PRONG

## (undated) DEVICE — ELCTR GROUNDING PAD ADULT COVIDIEN

## (undated) DEVICE — VENODYNE/SCD SLEEVE CALF MEDIUM

## (undated) DEVICE — XI ARM NEEDLE DRIVER SUTURECUT MEGA 8MM

## (undated) DEVICE — DRAPE TOWEL BLUE 17" X 24"

## (undated) DEVICE — NDL HYPO SAFE 25G X 1.5" (ORANGE)

## (undated) DEVICE — SOL IRR POUR H2O 1000ML

## (undated) DEVICE — NDL SPINAL 22G X 3.5" (BLACK)

## (undated) DEVICE — AVETA FLUID MANAGEMENT ACCESSORY W CAP

## (undated) DEVICE — XI DRAPE ARM

## (undated) DEVICE — LAP PAD W RING 18 X 18"